# Patient Record
(demographics unavailable — no encounter records)

---

## 2024-11-11 NOTE — PHYSICAL EXAM
[No Acute Distress] : no acute distress [Well Nourished] : well nourished [Well Developed] : well developed [Well-Appearing] : well-appearing [Normal Sclera/Conjunctiva] : normal sclera/conjunctiva [PERRL] : pupils equal round and reactive to light [EOMI] : extraocular movements intact [Normal Outer Ear/Nose] : the outer ears and nose were normal in appearance [Normal Oropharynx] : the oropharynx was normal [No JVD] : no jugular venous distention [No Lymphadenopathy] : no lymphadenopathy [Supple] : supple [Thyroid Normal, No Nodules] : the thyroid was normal and there were no nodules present [No Respiratory Distress] : no respiratory distress  [No Accessory Muscle Use] : no accessory muscle use [Clear to Auscultation] : lungs were clear to auscultation bilaterally [Normal Rate] : normal rate  [Regular Rhythm] : with a regular rhythm [Normal S1, S2] : normal S1 and S2 [No Murmur] : no murmur heard [No Carotid Bruits] : no carotid bruits [No Varicosities] : no varicosities [Pedal Pulses Present] : the pedal pulses are present [No Edema] : there was no peripheral edema [No Extremity Clubbing/Cyanosis] : no extremity clubbing/cyanosis [Soft] : abdomen soft [Non Tender] : non-tender [Non-distended] : non-distended [Normal Bowel Sounds] : normal bowel sounds [Normal Posterior Cervical Nodes] : no posterior cervical lymphadenopathy [Normal Anterior Cervical Nodes] : no anterior cervical lymphadenopathy [No CVA Tenderness] : no CVA  tenderness [No Spinal Tenderness] : no spinal tenderness [No Joint Swelling] : no joint swelling [Grossly Normal Strength/Tone] : grossly normal strength/tone [No Rash] : no rash [Coordination Grossly Intact] : coordination grossly intact [No Focal Deficits] : no focal deficits [Normal Gait] : normal gait [Normal Affect] : the affect was normal [Normal Insight/Judgement] : insight and judgment were intact [No Nipple Discharge] : no nipple discharge [Alert and Oriented x3] : oriented to person, place, and time [de-identified] : lower abd R sided hernia, reducable and non tender

## 2024-11-11 NOTE — ADDENDUM
[FreeTextEntry1] : This note was written by Bev Perdomo on 11/07/2024 acting as medical scribe for Dr. Avril Leslie. I, Dr. Avril Leslie, have read and attest that all the information, medical decision making and discharge instructions within are true and accurate.

## 2024-11-11 NOTE — HISTORY OF PRESENT ILLNESS
[Spouse] : spouse [FreeTextEntry1] : think she has a hernia fill out forms [de-identified] : This is a 80 y/o female with a pmhx of CAD, dilated aortic root, HTN here today for a follow up. Patient needs a form to be filled out for permanent disability and severally limited because she has difficulty walking, has a herniated disc, arthritis and numbness in her foot and weakness in her leg. She does not use a walker, cane, wheelchair just has to stop when walking.  When pt lays down she has swelling on the right side of her groin, says she noticed it on 10/15, denies any pain, n/v. Denies dysuria, urinary frequency, urgency, hematuria. Normal stools. Denies chest pain, SOB, MANTILLA, dizziness, diaphoresis, palpitations, LE swelling, orthopnea, syncope, n/v, headache.

## 2024-11-11 NOTE — HEALTH RISK ASSESSMENT
[0] : 2) Feeling down, depressed, or hopeless: Not at all (0) [PHQ-2 Negative - No further assessment needed] : PHQ-2 Negative - No further assessment needed [Former] : Former [[id number]] : 0

## 2024-11-11 NOTE — HEALTH RISK ASSESSMENT
[0] : 2) Feeling down, depressed, or hopeless: Not at all (0) [PHQ-2 Negative - No further assessment needed] : PHQ-2 Negative - No further assessment needed [Former] : Former [[id number]] : 0

## 2024-11-11 NOTE — HISTORY OF PRESENT ILLNESS
[Spouse] : spouse [FreeTextEntry1] : think she has a hernia fill out forms [de-identified] : This is a 80 y/o female with a pmhx of CAD, dilated aortic root, HTN here today for a follow up. Patient needs a form to be filled out for permanent disability and severally limited because she has difficulty walking, has a herniated disc, arthritis and numbness in her foot and weakness in her leg. She does not use a walker, cane, wheelchair just has to stop when walking.  When pt lays down she has swelling on the right side of her groin, says she noticed it on 10/15, denies any pain, n/v. Denies dysuria, urinary frequency, urgency, hematuria. Normal stools. Denies chest pain, SOB, MANTILLA, dizziness, diaphoresis, palpitations, LE swelling, orthopnea, syncope, n/v, headache.

## 2024-11-11 NOTE — PHYSICAL EXAM
[No Acute Distress] : no acute distress [Well Nourished] : well nourished [Well Developed] : well developed [Well-Appearing] : well-appearing [Normal Sclera/Conjunctiva] : normal sclera/conjunctiva [PERRL] : pupils equal round and reactive to light [EOMI] : extraocular movements intact [Normal Outer Ear/Nose] : the outer ears and nose were normal in appearance [Normal Oropharynx] : the oropharynx was normal [No JVD] : no jugular venous distention [No Lymphadenopathy] : no lymphadenopathy [Supple] : supple [Thyroid Normal, No Nodules] : the thyroid was normal and there were no nodules present [No Respiratory Distress] : no respiratory distress  [No Accessory Muscle Use] : no accessory muscle use [Clear to Auscultation] : lungs were clear to auscultation bilaterally [Normal Rate] : normal rate  [Regular Rhythm] : with a regular rhythm [Normal S1, S2] : normal S1 and S2 [No Murmur] : no murmur heard [No Carotid Bruits] : no carotid bruits [No Varicosities] : no varicosities [Pedal Pulses Present] : the pedal pulses are present [No Edema] : there was no peripheral edema [No Extremity Clubbing/Cyanosis] : no extremity clubbing/cyanosis [Soft] : abdomen soft [Non Tender] : non-tender [Non-distended] : non-distended [Normal Bowel Sounds] : normal bowel sounds [Normal Posterior Cervical Nodes] : no posterior cervical lymphadenopathy [Normal Anterior Cervical Nodes] : no anterior cervical lymphadenopathy [No CVA Tenderness] : no CVA  tenderness [No Spinal Tenderness] : no spinal tenderness [No Joint Swelling] : no joint swelling [Grossly Normal Strength/Tone] : grossly normal strength/tone [No Rash] : no rash [Coordination Grossly Intact] : coordination grossly intact [No Focal Deficits] : no focal deficits [Normal Gait] : normal gait [Normal Affect] : the affect was normal [Normal Insight/Judgement] : insight and judgment were intact [No Nipple Discharge] : no nipple discharge [Alert and Oriented x3] : oriented to person, place, and time [de-identified] : lower abd R sided hernia, reducable and non tender

## 2024-11-11 NOTE — HEALTH RISK ASSESSMENT
[0] : 2) Feeling down, depressed, or hopeless: Not at all (0) [PHQ-2 Negative - No further assessment needed] : PHQ-2 Negative - No further assessment needed [Former] : Former [[id number]] : 0

## 2024-11-11 NOTE — HISTORY OF PRESENT ILLNESS
[Spouse] : spouse [FreeTextEntry1] : think she has a hernia fill out forms [de-identified] : This is a 80 y/o female with a pmhx of CAD, dilated aortic root, HTN here today for a follow up. Patient needs a form to be filled out for permanent disability and severally limited because she has difficulty walking, has a herniated disc, arthritis and numbness in her foot and weakness in her leg. She does not use a walker, cane, wheelchair just has to stop when walking.  When pt lays down she has swelling on the right side of her groin, says she noticed it on 10/15, denies any pain, n/v. Denies dysuria, urinary frequency, urgency, hematuria. Normal stools. Denies chest pain, SOB, MANTILLA, dizziness, diaphoresis, palpitations, LE swelling, orthopnea, syncope, n/v, headache.

## 2024-11-11 NOTE — PHYSICAL EXAM
[No Acute Distress] : no acute distress [Well Nourished] : well nourished [Well Developed] : well developed [Well-Appearing] : well-appearing [Normal Sclera/Conjunctiva] : normal sclera/conjunctiva [PERRL] : pupils equal round and reactive to light [EOMI] : extraocular movements intact [Normal Outer Ear/Nose] : the outer ears and nose were normal in appearance [Normal Oropharynx] : the oropharynx was normal [No JVD] : no jugular venous distention [No Lymphadenopathy] : no lymphadenopathy [Supple] : supple [Thyroid Normal, No Nodules] : the thyroid was normal and there were no nodules present [No Respiratory Distress] : no respiratory distress  [No Accessory Muscle Use] : no accessory muscle use [Clear to Auscultation] : lungs were clear to auscultation bilaterally [Normal Rate] : normal rate  [Regular Rhythm] : with a regular rhythm [Normal S1, S2] : normal S1 and S2 [No Murmur] : no murmur heard [No Carotid Bruits] : no carotid bruits [No Varicosities] : no varicosities [Pedal Pulses Present] : the pedal pulses are present [No Edema] : there was no peripheral edema [No Extremity Clubbing/Cyanosis] : no extremity clubbing/cyanosis [Soft] : abdomen soft [Non Tender] : non-tender [Non-distended] : non-distended [Normal Bowel Sounds] : normal bowel sounds [Normal Posterior Cervical Nodes] : no posterior cervical lymphadenopathy [Normal Anterior Cervical Nodes] : no anterior cervical lymphadenopathy [No CVA Tenderness] : no CVA  tenderness [No Spinal Tenderness] : no spinal tenderness [No Joint Swelling] : no joint swelling [Grossly Normal Strength/Tone] : grossly normal strength/tone [No Rash] : no rash [Coordination Grossly Intact] : coordination grossly intact [No Focal Deficits] : no focal deficits [Normal Gait] : normal gait [Normal Affect] : the affect was normal [Normal Insight/Judgement] : insight and judgment were intact [No Nipple Discharge] : no nipple discharge [Alert and Oriented x3] : oriented to person, place, and time [de-identified] : lower abd R sided hernia, reducable and non tender

## 2024-11-18 NOTE — HISTORY OF PRESENT ILLNESS
[de-identified] : Margarita is a 80 y/o female here for a consultation visit, possible hernia.   CT A+P on 11/15/24 - 3.8 cm fat-containing right inguinal hernia. Hernia does not contain abdominal viscera.. No other abdominal wall hernia.

## 2024-12-20 NOTE — PHYSICAL EXAM
[Normal Breath Sounds] : Normal breath sounds [Normal Heart Sounds] : normal heart sounds [No Rash or Lesion] : No rash or lesion [Alert] : alert [Oriented to Person] : oriented to person [Oriented to Place] : oriented to place [Oriented to Time] : oriented to time [Calm] : calm [de-identified] : WNL [de-identified] : WNL [de-identified] : JOZEFL [de-identified] : Reducible moderate-sized right inguinal hernia.  No palpable left inguinal hernia.  No femoral hernias.  Well-healed abdominoplasty incision [de-identified] : WNL [de-identified] : WNL

## 2024-12-20 NOTE — CONSULT LETTER
[Dear  ___] : Dear  [unfilled], [Consult Letter:] : I had the pleasure of evaluating your patient, [unfilled]. [Please see my note below.] : Please see my note below. [Consult Closing:] : Thank you very much for allowing me to participate in the care of this patient.  If you have any questions, please do not hesitate to contact me. [Sincerely,] : Sincerely, [FreeTextEntry3] : Jack Echavarria M.D., F.A.C.S, F.A.S.C.R.S

## 2024-12-20 NOTE — ASSESSMENT
[FreeTextEntry1] : In summary the patient has a symptomatic reducible right inguinal hernia.  I recommended that this be electively repaired with mesh.  I explained the risks benefits and alternatives including the rare complications of bleeding infection recurrence and prolonged postoperative pain and numbness.

## 2024-12-20 NOTE — HISTORY OF PRESENT ILLNESS
[de-identified] : Margarita is a 80 y/o female here for a consultation visit, possible hernia.   CT A+P on 11/15/24 - 3.8 cm fat-containing right inguinal hernia. Hernia does not contain abdominal viscera.. No other abdominal wall hernia.   Patient felt and seen a reducible bulge in her right groin since mid-October.   It does not make any gurgling sound but gives her discomfort when standing for prolonged period of time.  Regular BMs once daily.  No nausea or vomiting.  Denies fever or chills.  Patient is on baby aspirin for prevention.  Abdominal surgery history, , tubal ligation, hysterectomy and abdominoplasty.

## 2024-12-20 NOTE — PHYSICAL EXAM
[Normal Breath Sounds] : Normal breath sounds [Normal Heart Sounds] : normal heart sounds [No Rash or Lesion] : No rash or lesion [Alert] : alert [Oriented to Person] : oriented to person [Oriented to Place] : oriented to place [Oriented to Time] : oriented to time [Calm] : calm [de-identified] : WNL [de-identified] : WNL [de-identified] : JOZEFL [de-identified] : Reducible moderate-sized right inguinal hernia.  No palpable left inguinal hernia.  No femoral hernias.  Well-healed abdominoplasty incision [de-identified] : WNL [de-identified] : WNL

## 2024-12-20 NOTE — HISTORY OF PRESENT ILLNESS
[de-identified] : Margarita is a 78 y/o female here for a consultation visit, possible hernia.   CT A+P on 11/15/24 - 3.8 cm fat-containing right inguinal hernia. Hernia does not contain abdominal viscera.. No other abdominal wall hernia.   Patient felt and seen a reducible bulge in her right groin since mid-October.   It does not make any gurgling sound but gives her discomfort when standing for prolonged period of time.  Regular BMs once daily.  No nausea or vomiting.  Denies fever or chills.  Patient is on baby aspirin for prevention.  Abdominal surgery history, , tubal ligation, hysterectomy and abdominoplasty.

## 2025-01-03 NOTE — HISTORY OF PRESENT ILLNESS
[FreeTextEntry1] : This is a 78 y/o female with a pmhx of CAD, dilated aortic root, HTN here today for a follow up.  CT A+P on 11/15/24 - 3.8 cm fat-containing right inguinal hernia. Hernia does not contain abdominal viscera.. No other abdominal wall hernia. seen Dr. Echavarria pending right inguinal hernia repair with mesh on 02/19/2024.  Denies chest pain, palpitations, diaphoresis, vision changes, HA, dizziness, syncope, cough, wheezing, SOB/MANTILLA, edema, fever, chills, infection.

## 2025-01-03 NOTE — PHYSICAL EXAM

## 2025-02-13 NOTE — PHYSICAL EXAM
[No Acute Distress] : no acute distress [Well Nourished] : well nourished [Well Developed] : well developed [Well-Appearing] : well-appearing [Normal Sclera/Conjunctiva] : normal sclera/conjunctiva [PERRL] : pupils equal round and reactive to light [EOMI] : extraocular movements intact [Normal Outer Ear/Nose] : the outer ears and nose were normal in appearance [Normal Oropharynx] : the oropharynx was normal [No JVD] : no jugular venous distention [No Lymphadenopathy] : no lymphadenopathy [Supple] : supple [Thyroid Normal, No Nodules] : the thyroid was normal and there were no nodules present [No Respiratory Distress] : no respiratory distress  [No Accessory Muscle Use] : no accessory muscle use [Clear to Auscultation] : lungs were clear to auscultation bilaterally [Normal Rate] : normal rate  [Regular Rhythm] : with a regular rhythm [Normal S1, S2] : normal S1 and S2 [No Murmur] : no murmur heard [No Carotid Bruits] : no carotid bruits [No Varicosities] : no varicosities [Pedal Pulses Present] : the pedal pulses are present [No Edema] : there was no peripheral edema [No Extremity Clubbing/Cyanosis] : no extremity clubbing/cyanosis [No Nipple Discharge] : no nipple discharge [Soft] : abdomen soft [Non Tender] : non-tender [Non-distended] : non-distended [Normal Bowel Sounds] : normal bowel sounds [Normal Posterior Cervical Nodes] : no posterior cervical lymphadenopathy [Normal Anterior Cervical Nodes] : no anterior cervical lymphadenopathy [No CVA Tenderness] : no CVA  tenderness [No Spinal Tenderness] : no spinal tenderness [No Joint Swelling] : no joint swelling [Grossly Normal Strength/Tone] : grossly normal strength/tone [No Rash] : no rash [Coordination Grossly Intact] : coordination grossly intact [No Focal Deficits] : no focal deficits [Normal Gait] : normal gait [Normal Affect] : the affect was normal [Alert and Oriented x3] : oriented to person, place, and time [Normal Insight/Judgement] : insight and judgment were intact

## 2025-02-15 NOTE — HEALTH RISK ASSESSMENT
[0] : 2) Feeling down, depressed, or hopeless: Not at all (0) [PHQ-2 Negative - No further assessment needed] : PHQ-2 Negative - No further assessment needed [Former] : Former [[id number]] : 0

## 2025-02-15 NOTE — HEALTH RISK ASSESSMENT
[0] : 2) Feeling down, depressed, or hopeless: Not at all (0) [PHQ-2 Negative - No further assessment needed] : PHQ-2 Negative - No further assessment needed [Former] : Former [[id number]] : 0

## 2025-02-15 NOTE — ADDENDUM
[FreeTextEntry1] : This note was written by Bev Perdomo on 02/13/2025 acting as medical scribe for Dr. Avril Leslie. I, Dr. Avril Leslie, have read and attest that all the information, medical decision making and discharge instructions within are true and accurate.

## 2025-02-15 NOTE — HISTORY OF PRESENT ILLNESS
[Coronary Artery Disease] : coronary artery disease [No Pertinent Pulmonary History] : no history of asthma, COPD, sleep apnea, or smoking [No Adverse Anesthesia Reaction] : no adverse anesthesia reaction in self or family member [(Patient denies any chest pain, claudication, dyspnea on exertion, orthopnea, palpitations or syncope)] : Patient denies any chest pain, claudication, dyspnea on exertion, orthopnea, palpitations or syncope [Good (7-10 METs)] : Good (7-10 METs) [Aortic Stenosis] : no aortic stenosis [Atrial Fibrillation] : no atrial fibrillation [Recent Myocardial Infarction] : no recent myocardial infarction [Implantable Device/Pacemaker] : no implantable device/pacemaker [Asthma] : no asthma [COPD] : no COPD [Sleep Apnea] : no sleep apnea [Smoker] : not a smoker [Family Member] : no family member with adverse anesthesia reaction/sudden death [Self] : no previous adverse anesthesia reaction [Chronic Anticoagulation] : no chronic anticoagulation [Chronic Kidney Disease] : no chronic kidney disease [Diabetes] : no diabetes [FreeTextEntry1] :  Right inguinal hernia repair with mesh      [FreeTextEntry2] : 2/19/2025 [FreeTextEntry3] : Dr. Jack Echavarria  [FreeTextEntry4] : This is a 80 y/o female with a pmhx of CAD, dilated aortic root, HTN here today for pre-op eval. Pt reports since Saturday she has noticed a bump in her L nostril. Denies any tenderness. Pt feels well. No complaints. Denies chest pain, SOB, MANTILLA, dizziness, diaphoresis, palpitations, LE swelling, orthopnea, syncope, n/v, headache. [FreeTextEntry7] : EKG today: SR unchanged PRWP no acute ST/T changes  1/2025- TTE normal ef grade 1 DD 11/2022- cath with no significant obstructions

## 2025-02-21 NOTE — HISTORY OF PRESENT ILLNESS
[de-identified] : Margarita is a 78 y/o female here for a post-op visit, s/p Right inguinal hernia repair with mesh and excision of right inguinal lipoma on 2/19/25.

## 2025-03-07 NOTE — ASSESSMENT
[FreeTextEntry1] : In summary the patient is doing well status post right inguinal hernia repair with mesh.  There is a small open area at the right lateral aspect of her incision.  This was cauterized with silver nitrate.  There is no evidence of infection.  I will see her back in the office in 1 week for a wound check.

## 2025-03-07 NOTE — HISTORY OF PRESENT ILLNESS
[de-identified] : Margarita is a 78 y/o female here for a post-op visit, s/p Right inguinal hernia repair with mesh and excision of right inguinal lipoma on 2/19/25.  Pathology: 1  Right groin lipoma Soft tissue, right inguinal region, herniorrhaphy -   Mature adipose tissue consistent with lipoma of cord  Today patient denies surgical incisional pain, had some discomfort few days after surgery took Tylenol.  BMs regular once daily.  Good appetite.  Denies fever or chills.  Surgical incision sites with steri strips, no drainage.

## 2025-03-07 NOTE — PHYSICAL EXAM
[de-identified] : Right groin incision healed except for a small open area at the right lateral aspect of the incision.  There is no sign of infection.  The open area was cauterized with silver nitrate.

## 2025-03-07 NOTE — HISTORY OF PRESENT ILLNESS
[de-identified] : Margarita is a 80 y/o female here for a post-op visit, s/p Right inguinal hernia repair with mesh and excision of right inguinal lipoma on 2/19/25.  Pathology: 1  Right groin lipoma Soft tissue, right inguinal region, herniorrhaphy -   Mature adipose tissue consistent with lipoma of cord  Today patient denies surgical incisional pain, had some discomfort few days after surgery took Tylenol.  BMs regular once daily.  Good appetite.  Denies fever or chills.  Surgical incision sites with steri strips, no drainage.

## 2025-03-07 NOTE — PHYSICAL EXAM
[de-identified] : Right groin incision healed except for a small open area at the right lateral aspect of the incision.  There is no sign of infection.  The open area was cauterized with silver nitrate.

## 2025-03-28 NOTE — HISTORY OF PRESENT ILLNESS
[de-identified] : Margarita is a 78 y/o female here for a post-op visit, s/p Right inguinal hernia repair with mesh and excision of right inguinal lipoma on 2/19/25. Pathology: 1 Right groin lipoma Soft tissue, right inguinal region, herniorrhaphy - Mature adipose tissue consistent with lipoma of cord  Pt was last seen 3/7-  In summary the patient is doing well status post right inguinal hernia repair with mesh. There is a small open area at the right lateral aspect of her incision. This was cauterized with silver nitrate. There is no evidence of infection. I will see her back in the office in 1 week for a wound check.   Today pt reports no pain. Denies drainage, bleeding, swelling, and redness of surgical incision. Denies nausea and vomiting. Denies fever and chills. Normal BM, denies constipation or diarrhea. Good appetite.

## 2025-03-28 NOTE — ASSESSMENT
[FreeTextEntry1] : In summary the patient is doing well status post right inguinal hernia repair with mesh.  I instructed her that she may resume her normal activities as tolerated.  Her incision is healed.  I only need to see her as needed.

## 2025-03-28 NOTE — HISTORY OF PRESENT ILLNESS
[de-identified] : Margarita is a 78 y/o female here for a post-op visit, s/p Right inguinal hernia repair with mesh and excision of right inguinal lipoma on 2/19/25. Pathology: 1 Right groin lipoma Soft tissue, right inguinal region, herniorrhaphy - Mature adipose tissue consistent with lipoma of cord  Pt was last seen 3/7-  In summary the patient is doing well status post right inguinal hernia repair with mesh. There is a small open area at the right lateral aspect of her incision. This was cauterized with silver nitrate. There is no evidence of infection. I will see her back in the office in 1 week for a wound check.   Today pt reports no pain. Denies drainage, bleeding, swelling, and redness of surgical incision. Denies nausea and vomiting. Denies fever and chills. Normal BM, denies constipation or diarrhea. Good appetite.

## 2025-03-28 NOTE — HISTORY OF PRESENT ILLNESS
[de-identified] : Margarita is a 80 y/o female here for a post-op visit, s/p Right inguinal hernia repair with mesh and excision of right inguinal lipoma on 2/19/25. Pathology: 1 Right groin lipoma Soft tissue, right inguinal region, herniorrhaphy - Mature adipose tissue consistent with lipoma of cord  Pt was last seen 3/7-  In summary the patient is doing well status post right inguinal hernia repair with mesh. There is a small open area at the right lateral aspect of her incision. This was cauterized with silver nitrate. There is no evidence of infection. I will see her back in the office in 1 week for a wound check.   Today pt reports no pain. Denies drainage, bleeding, swelling, and redness of surgical incision. Denies nausea and vomiting. Denies fever and chills. Normal BM, denies constipation or diarrhea. Good appetite.

## 2025-03-28 NOTE — HISTORY OF PRESENT ILLNESS
[de-identified] : Margarita is a 78 y/o female here for a post-op visit, s/p Right inguinal hernia repair with mesh and excision of right inguinal lipoma on 2/19/25. Pathology: 1 Right groin lipoma Soft tissue, right inguinal region, herniorrhaphy - Mature adipose tissue consistent with lipoma of cord  Pt was last seen 3/7-  In summary the patient is doing well status post right inguinal hernia repair with mesh. There is a small open area at the right lateral aspect of her incision. This was cauterized with silver nitrate. There is no evidence of infection. I will see her back in the office in 1 week for a wound check.   Today pt reports no pain. Denies drainage, bleeding, swelling, and redness of surgical incision. Denies nausea and vomiting. Denies fever and chills. Normal BM, denies constipation or diarrhea. Good appetite.